# Patient Record
Sex: FEMALE | Race: WHITE | NOT HISPANIC OR LATINO | Employment: FULL TIME | URBAN - NONMETROPOLITAN AREA
[De-identification: names, ages, dates, MRNs, and addresses within clinical notes are randomized per-mention and may not be internally consistent; named-entity substitution may affect disease eponyms.]

---

## 2023-09-28 ENCOUNTER — OFFICE VISIT (OUTPATIENT)
Dept: PRIMARY CARE | Facility: CLINIC | Age: 43
End: 2023-09-28
Payer: COMMERCIAL

## 2023-09-28 VITALS
RESPIRATION RATE: 16 BRPM | HEIGHT: 68 IN | BODY MASS INDEX: 27.2 KG/M2 | TEMPERATURE: 98.3 F | DIASTOLIC BLOOD PRESSURE: 86 MMHG | OXYGEN SATURATION: 97 % | WEIGHT: 179.5 LBS | SYSTOLIC BLOOD PRESSURE: 120 MMHG | HEART RATE: 68 BPM

## 2023-09-28 DIAGNOSIS — Z00.00 ENCOUNTER FOR WELLNESS EXAMINATION IN ADULT: Primary | ICD-10-CM

## 2023-09-28 DIAGNOSIS — L30.9 DERMATITIS: ICD-10-CM

## 2023-09-28 DIAGNOSIS — F40.243 FEAR OF FLYING: ICD-10-CM

## 2023-09-28 DIAGNOSIS — H72.91 PERFORATION OF RIGHT TYMPANIC MEMBRANE: ICD-10-CM

## 2023-09-28 DIAGNOSIS — E66.3 OVERWEIGHT WITH BODY MASS INDEX (BMI) OF 27 TO 27.9 IN ADULT: ICD-10-CM

## 2023-09-28 DIAGNOSIS — Z79.899 MEDICAL MARIJUANA USE: ICD-10-CM

## 2023-09-28 PROBLEM — Z15.02 MONOALLELIC MUTATION OF CHEK2 GENE IN FEMALE PATIENT: Chronic | Status: ACTIVE | Noted: 2022-08-30

## 2023-09-28 PROBLEM — O02.1 MISSED AB (HHS-HCC): Status: RESOLVED | Noted: 2023-09-28 | Resolved: 2023-09-28

## 2023-09-28 PROBLEM — F41.9 ANXIETY: Status: ACTIVE | Noted: 2018-01-23

## 2023-09-28 PROBLEM — Z15.89 MONOALLELIC MUTATION OF CHEK2 GENE IN FEMALE PATIENT: Chronic | Status: ACTIVE | Noted: 2022-08-30

## 2023-09-28 PROBLEM — O03.9 MISCARRIAGE (HHS-HCC): Status: RESOLVED | Noted: 2023-09-28 | Resolved: 2023-09-28

## 2023-09-28 PROBLEM — Z15.01 GENETIC PREDISPOSITION TO BREAST CANCER: Status: ACTIVE | Noted: 2023-05-23

## 2023-09-28 PROBLEM — O03.9 MISCARRIAGE (HHS-HCC): Status: ACTIVE | Noted: 2023-09-28

## 2023-09-28 PROBLEM — Z15.09 MONOALLELIC MUTATION OF CHEK2 GENE IN FEMALE PATIENT: Chronic | Status: ACTIVE | Noted: 2022-08-30

## 2023-09-28 PROBLEM — Z15.01 MONOALLELIC MUTATION OF CHEK2 GENE IN FEMALE PATIENT: Chronic | Status: ACTIVE | Noted: 2022-08-30

## 2023-09-28 PROBLEM — O02.1 MISSED AB (HHS-HCC): Status: ACTIVE | Noted: 2023-09-28

## 2023-09-28 PROBLEM — N92.6 IRREGULAR MENSES: Status: RESOLVED | Noted: 2023-09-28 | Resolved: 2023-09-28

## 2023-09-28 PROBLEM — N92.6 IRREGULAR MENSES: Status: ACTIVE | Noted: 2023-09-28

## 2023-09-28 PROCEDURE — 99386 PREV VISIT NEW AGE 40-64: CPT | Performed by: FAMILY MEDICINE

## 2023-09-28 PROCEDURE — 1036F TOBACCO NON-USER: CPT | Performed by: FAMILY MEDICINE

## 2023-09-28 PROCEDURE — 99203 OFFICE O/P NEW LOW 30 MIN: CPT | Performed by: FAMILY MEDICINE

## 2023-09-28 PROCEDURE — 3008F BODY MASS INDEX DOCD: CPT | Performed by: FAMILY MEDICINE

## 2023-09-28 RX ORDER — ACETAMINOPHEN 500 MG
TABLET ORAL
COMMUNITY

## 2023-09-28 RX ORDER — LANOLIN ALCOHOL/MO/W.PET/CERES
100 CREAM (GRAM) TOPICAL
COMMUNITY

## 2023-09-28 RX ORDER — TRIAMCINOLONE ACETONIDE 1 MG/G
CREAM TOPICAL 2 TIMES DAILY
Qty: 15 G | Refills: 0 | Status: SHIPPED | OUTPATIENT
Start: 2023-09-28 | End: 2023-10-05

## 2023-09-28 RX ORDER — MULTIVITAMIN
1 TABLET ORAL
COMMUNITY

## 2023-09-28 RX ORDER — IBUPROFEN 800 MG/1
TABLET ORAL
COMMUNITY

## 2023-09-28 RX ORDER — VALACYCLOVIR HYDROCHLORIDE 1 G/1
1000 TABLET, FILM COATED ORAL
COMMUNITY
Start: 2022-12-28 | End: 2024-03-29 | Stop reason: SDUPTHER

## 2023-09-28 RX ORDER — ALPRAZOLAM 0.25 MG/1
0.25 TABLET ORAL DAILY PRN
Qty: 2 TABLET | Refills: 0 | Status: SHIPPED | OUTPATIENT
Start: 2023-09-28 | End: 2023-09-30

## 2023-09-28 ASSESSMENT — PATIENT HEALTH QUESTIONNAIRE - PHQ9
2. FEELING DOWN, DEPRESSED OR HOPELESS: NOT AT ALL
1. LITTLE INTEREST OR PLEASURE IN DOING THINGS: NOT AT ALL
SUM OF ALL RESPONSES TO PHQ9 QUESTIONS 1 AND 2: 0

## 2023-09-28 NOTE — PROGRESS NOTES
"Subjective   Patient ID: Abena Waller is a 42 y.o. female who presents for Annual Exam. Patient declines flu vaccine today.      HPI   Diet: well rounded  Supplements/vitamins: see list  Alcohol use: socially  Caffeine intake: Y, 1.5 cups/day  Exercise: 4-5 days/week, crossfit  Sleep: no issues  Last dental appointment: routine,   Last eye appointment: 1.5 years ago  Anxiety/Depression: Y, anxiety  LMP: IUD  Menstrual cycles: none due to IUD  Contraception: IUD  Last pap smear: GYN,   Mammogram: see below  Fmhx breast cancer: high risk, managed by specialist. Gets MRI every other year with mammogram every other year. Last MRI 9/2022, Mammogram 3/2023.   Cscope: [facility], 2023 (normal)  Fmx colon cancer: Check2 gene  Tobacco use/Lung cancer screening: N  Recreational drug use: N  Immunizations: up to date    Saw ENT () for ruptured right ear drum. Next appointment is June 2024. Denies vertigo, tinnitus. Sound is muffled.    Wedding band caused dermatitis on her ring finger (left hand), skin is sensitive, red and peeling. No known allergy to metals. Has not attempted home treatment.     Panic Attack  First occurrence was on a plane, traumatic  Has taken xanax in the past,does not use it often  Plans on flying in 2 weeks for work  Has done counseling  Patient has medical card for marijuana    Review of Systems   All other systems reviewed and are negative.    Objective   /86 (BP Location: Left arm, Patient Position: Sitting, BP Cuff Size: Large adult)   Pulse 68   Temp 36.8 °C (98.3 °F) (Temporal)   Resp 16   Ht 1.727 m (5' 8\")   Wt 81.4 kg (179 lb 8 oz)   SpO2 97%   BMI 27.29 kg/m²     Physical Exam  Constitutional: Well developed, well nourished, alert and in no acute distress.  Head and Face: Normocephalic, atraumatic.  Eyes: Normal external exam. Pupils equally round and reactive to light with normal accommodation and extraocular movements intact.   ENT: " External inspection of ears normal, tympanic membranes visualized and normal. Nasal mucosa, septum, and turbinates normal. Oral mucosa moist, oropharynx clear.   Neck: Supple, no lymphadenopathy or masses. Thyroid not enlarged, no palpable nodules.   Cardiovascular: Regular rate and rhythm, normal S1 and S2, no murmurs, gallops, or rubs. Radial pulses normal. No peripheral edema. No carotid bruits.   Pulmonary: No respiratory distress, lungs clear to auscultation bilaterally. No wheezes, rhonchi, rales.   Abdomen: Soft, nontender, nondistended, normal bowel sounds. No masses palpated.   Musculoskeletal: Gait normal. Muscle strength/tone normal of all 4 extremities. Normal range of motion of all extremities.   Skin: Warm, well perfused, normal skin turgor and color, +4th finger of left hand with erythema and peeling skin underneath ring.  Neurologic: Cranial nerves II-XII grossly intact. Deep tendon reflexes were 2+ and symmetric. Sensation normal bilaterally.   Psychiatric: Mood calm and affect normal.    Assessment/Plan   Recommendations for women annual wellness exam:   Make sure screenings for cervical and breast cancer are up to date if applicable- pap smears age 21-65-GYN  Discuss mammogram starting at age 40 or sooner if positive family history of breast cancer-up to date   STD screening   Follow a healthy diet (Dash diet, Mediterranean diet)  Exercise 150 min/wk   Maintain healthy weight (BMI < 25)-your BMI is 27.  Do not smoke   Alcohol in moderation (up to 1 drink/day)  Get enough sleep (7-8 hours/night)  Take a prenatal vitamin with folic acid if possibility of pregnancy   Make sure immunizations are up to date (influenza, Tdap)-up to date.   Premenopausal women need minimum 1,000 mg calcium and 600-800 IU vitamin D daily (combination of diet + supplement)  Talk to your physician if you have concerns about depression or anxiety  Visit dentist twice yearly  Colon Cancer Screening-up to date     I personally  have reviewed the [facility] report for this patient.  This report is scanned into the electronic medical record.  I have considered the risks of abuse, dependence, addiction and diversion.  I believe that it is clinically appropriate for the patient to be prescribed this medication.    START triamcinolone topical steroid as directed. Do NOT use more than 14 days at a time as it can thin skin and cause discoloration.    Fasting labs ordered    Follow up in 1 year

## 2023-12-14 ENCOUNTER — LAB (OUTPATIENT)
Dept: LAB | Facility: LAB | Age: 43
End: 2023-12-14
Payer: COMMERCIAL

## 2023-12-14 DIAGNOSIS — Z00.00 ENCOUNTER FOR WELLNESS EXAMINATION IN ADULT: ICD-10-CM

## 2023-12-14 LAB
ALBUMIN SERPL BCP-MCNC: 5 G/DL (ref 3.4–5)
ALP SERPL-CCNC: 45 U/L (ref 33–110)
ALT SERPL W P-5'-P-CCNC: 68 U/L (ref 7–45)
ANION GAP SERPL CALC-SCNC: 16 MMOL/L (ref 10–20)
AST SERPL W P-5'-P-CCNC: 58 U/L (ref 9–39)
BILIRUB SERPL-MCNC: 0.9 MG/DL (ref 0–1.2)
BUN SERPL-MCNC: 9 MG/DL (ref 6–23)
CALCIUM SERPL-MCNC: 10.2 MG/DL (ref 8.6–10.6)
CHLORIDE SERPL-SCNC: 99 MMOL/L (ref 98–107)
CHOLEST SERPL-MCNC: 245 MG/DL (ref 0–199)
CHOLESTEROL/HDL RATIO: 2.1
CO2 SERPL-SCNC: 26 MMOL/L (ref 21–32)
CREAT SERPL-MCNC: 0.72 MG/DL (ref 0.5–1.05)
ERYTHROCYTE [DISTWIDTH] IN BLOOD BY AUTOMATED COUNT: 12.8 % (ref 11.5–14.5)
GFR SERPL CREATININE-BSD FRML MDRD: >90 ML/MIN/1.73M*2
GLUCOSE SERPL-MCNC: 84 MG/DL (ref 74–99)
HCT VFR BLD AUTO: 39.4 % (ref 36–46)
HDLC SERPL-MCNC: 118.6 MG/DL
HGB BLD-MCNC: 13.4 G/DL (ref 12–16)
LDLC SERPL CALC-MCNC: 112 MG/DL
MCH RBC QN AUTO: 31.9 PG (ref 26–34)
MCHC RBC AUTO-ENTMCNC: 34 G/DL (ref 32–36)
MCV RBC AUTO: 94 FL (ref 80–100)
NON HDL CHOLESTEROL: 126 MG/DL (ref 0–149)
NRBC BLD-RTO: 0 /100 WBCS (ref 0–0)
PLATELET # BLD AUTO: 186 X10*3/UL (ref 150–450)
POTASSIUM SERPL-SCNC: 4.7 MMOL/L (ref 3.5–5.3)
PROT SERPL-MCNC: 7.9 G/DL (ref 6.4–8.2)
RBC # BLD AUTO: 4.2 X10*6/UL (ref 4–5.2)
SODIUM SERPL-SCNC: 136 MMOL/L (ref 136–145)
TRIGL SERPL-MCNC: 70 MG/DL (ref 0–149)
VLDL: 14 MG/DL (ref 0–40)
WBC # BLD AUTO: 3.8 X10*3/UL (ref 4.4–11.3)

## 2023-12-14 PROCEDURE — 80053 COMPREHEN METABOLIC PANEL: CPT

## 2023-12-14 PROCEDURE — 85027 COMPLETE CBC AUTOMATED: CPT

## 2023-12-14 PROCEDURE — 80061 LIPID PANEL: CPT

## 2023-12-14 PROCEDURE — 36415 COLL VENOUS BLD VENIPUNCTURE: CPT

## 2024-03-29 DIAGNOSIS — Z86.19 HISTORY OF VIRAL INFECTION: Primary | ICD-10-CM

## 2024-03-29 RX ORDER — VALACYCLOVIR HYDROCHLORIDE 1 G/1
1000 TABLET, FILM COATED ORAL
Qty: 30 TABLET | Refills: 0 | Status: SHIPPED | OUTPATIENT
Start: 2024-03-29

## 2024-06-13 ENCOUNTER — OFFICE VISIT (OUTPATIENT)
Dept: PRIMARY CARE | Facility: CLINIC | Age: 44
End: 2024-06-13
Payer: COMMERCIAL

## 2024-06-13 VITALS
BODY MASS INDEX: 27.9 KG/M2 | RESPIRATION RATE: 16 BRPM | SYSTOLIC BLOOD PRESSURE: 139 MMHG | OXYGEN SATURATION: 97 % | HEART RATE: 76 BPM | TEMPERATURE: 99 F | DIASTOLIC BLOOD PRESSURE: 82 MMHG | WEIGHT: 183.5 LBS

## 2024-06-13 DIAGNOSIS — R30.0 DYSURIA: ICD-10-CM

## 2024-06-13 DIAGNOSIS — R35.0 URINARY FREQUENCY: Primary | ICD-10-CM

## 2024-06-13 LAB
POC APPEARANCE, URINE: ABNORMAL
POC BILIRUBIN, URINE: NEGATIVE
POC BLOOD, URINE: NEGATIVE
POC COLOR, URINE: YELLOW
POC GLUCOSE, URINE: NEGATIVE MG/DL
POC KETONES, URINE: NEGATIVE MG/DL
POC LEUKOCYTES, URINE: ABNORMAL
POC NITRITE,URINE: NEGATIVE
POC PH, URINE: 7 PH
POC PROTEIN, URINE: NEGATIVE MG/DL
POC SPECIFIC GRAVITY, URINE: 1.01
POC UROBILINOGEN, URINE: 0.2 EU/DL

## 2024-06-13 PROCEDURE — 3008F BODY MASS INDEX DOCD: CPT | Performed by: FAMILY MEDICINE

## 2024-06-13 PROCEDURE — 99213 OFFICE O/P EST LOW 20 MIN: CPT | Performed by: FAMILY MEDICINE

## 2024-06-13 PROCEDURE — 1036F TOBACCO NON-USER: CPT | Performed by: FAMILY MEDICINE

## 2024-06-13 PROCEDURE — 87086 URINE CULTURE/COLONY COUNT: CPT

## 2024-06-13 PROCEDURE — 81003 URINALYSIS AUTO W/O SCOPE: CPT | Performed by: FAMILY MEDICINE

## 2024-06-13 ASSESSMENT — ENCOUNTER SYMPTOMS
BACK PAIN: 0
FREQUENCY: 1
DIFFICULTY URINATING: 0
DYSURIA: 1

## 2024-06-13 NOTE — PROGRESS NOTES
Subjective   Patient ID: Abena Waller is a 43 y.o. female who presents for UTI .    HPI   Duration 1 day  Symptoms  dysuria: Y  urinary frequency: Y  urinary urgency: Y  foul smell urine: N  cloudy urine: Y  blood in urine: N  urinary incontinence: N  abdominal pain:Y  back pain:N  fever:N  chills:N  nausea:N  vomiting:N    Last UTI was when she was in college  She is staying hydrated with water  Caffeine-2 cups in am   No constipation, not taking baths    Review of Systems   Genitourinary:  Positive for dysuria, frequency and urgency. Negative for difficulty urinating.   Musculoskeletal:  Negative for back pain.   All other systems reviewed and are negative.    Objective   /82 (BP Location: Right arm, Patient Position: Sitting, BP Cuff Size: Adult)   Pulse 76   Temp 37.2 °C (99 °F) (Temporal)   Resp 16   Wt 83.2 kg (183 lb 8 oz)   SpO2 97%   BMI 27.90 kg/m²     Physical Exam  Constitutional: Well developed, well nourished, alert and in no acute distress   Eyes: Normal external exam.   Cardiovascular: Regular rate and rhythm, normal S1 and S2, no murmurs, gallops, or rubs.   Pulmonary: No respiratory distress, lungs clear to auscultation bilaterally. No wheezes, rhonchi, rales.  Abdomen: Soft, nontender, nondistended, +BS.  Back: No CVA or flank TTP.   Skin: Warm, well perfused, normal skin turgor and color.   Neurologic: Cranial nerves II-XII grossly intact.   Psychiatric: Mood calm and affect normal.    Assessment/Plan   UTI PLAN:   Be sure to stay well-hydrated by drinking plenty of water.   Take any medications as prescribed, and be sure to take any antibiotics until they are gone.     You can try taking OTC Uristat (pyridium) if needed for burning. Beware that it will turn your urine orange/red.    To avoid future urinary tract infections:  Avoid tub baths.   For females, be sure to wipe the urethra from front to back after urinating.  Urinating after sexual intercourse may also help decrease your  risk of having another urinary tract infection.   Empty bladder at least every 4 hours - prolonged travel or busy day delaying emptying bladder can be a set up for infection.    Please call or return to the office if you are not feeling better in 5 days or worsen.    Pending urine culture

## 2024-06-15 LAB — BACTERIA UR CULT: NORMAL

## 2024-10-02 ENCOUNTER — APPOINTMENT (OUTPATIENT)
Dept: PRIMARY CARE | Facility: CLINIC | Age: 44
End: 2024-10-02
Payer: COMMERCIAL